# Patient Record
Sex: FEMALE | Race: WHITE | NOT HISPANIC OR LATINO | Employment: FULL TIME | URBAN - METROPOLITAN AREA
[De-identification: names, ages, dates, MRNs, and addresses within clinical notes are randomized per-mention and may not be internally consistent; named-entity substitution may affect disease eponyms.]

---

## 2017-06-14 ENCOUNTER — OFFICE VISIT (OUTPATIENT)
Dept: URGENT CARE | Facility: PHYSICIAN GROUP | Age: 37
End: 2017-06-14
Payer: COMMERCIAL

## 2017-06-14 VITALS
SYSTOLIC BLOOD PRESSURE: 114 MMHG | RESPIRATION RATE: 20 BRPM | BODY MASS INDEX: 24.49 KG/M2 | WEIGHT: 147 LBS | OXYGEN SATURATION: 93 % | HEART RATE: 69 BPM | TEMPERATURE: 99.6 F | HEIGHT: 65 IN | DIASTOLIC BLOOD PRESSURE: 76 MMHG

## 2017-06-14 DIAGNOSIS — J30.9 ALLERGIC RHINITIS, UNSPECIFIED ALLERGIC RHINITIS TRIGGER, UNSPECIFIED RHINITIS SEASONALITY: ICD-10-CM

## 2017-06-14 DIAGNOSIS — J02.9 PHARYNGITIS, UNSPECIFIED ETIOLOGY: ICD-10-CM

## 2017-06-14 DIAGNOSIS — J02.9 SORE THROAT: ICD-10-CM

## 2017-06-14 LAB
INT CON NEG: NEGATIVE
INT CON POS: POSITIVE
S PYO AG THROAT QL: NEGATIVE

## 2017-06-14 PROCEDURE — 99202 OFFICE O/P NEW SF 15 MIN: CPT | Performed by: EMERGENCY MEDICINE

## 2017-06-14 PROCEDURE — 87880 STREP A ASSAY W/OPTIC: CPT | Performed by: EMERGENCY MEDICINE

## 2017-06-14 ASSESSMENT — ENCOUNTER SYMPTOMS
SWOLLEN GLANDS: 1
MYALGIAS: 0
NAUSEA: 0
CHILLS: 1
HOARSE VOICE: 0
VOMITING: 0
DIARRHEA: 0
HEARTBURN: 0
HEADACHES: 0
COUGH: 0
SHORTNESS OF BREATH: 0

## 2017-06-14 NOTE — MR AVS SNAPSHOT
"        Maria Teresa Wick Justyna   2017 5:00 PM   Office Visit   MRN: [id number]    Department:  [facility]   Dept Phone:  [phone]    Description:  Female : 1980   Provider:  Wallace St M.D.           Reason for Visit     Sore Throat C/o sore throat x2 days      Allergies as of 2017     Allergen Noted Reactions    Cillins [Penicillins] 2017   Swelling      You were diagnosed with     Pharyngitis, unspecified etiology   [[id number]]       Sore throat   [[id number]]       Allergic rhinitis, unspecified allergic rhinitis trigger, unspecified rhinitis seasonality   [[id number]]         Vital Signs     Blood Pressure Pulse Temperature Respirations Height Weight    114/76 mmHg 69 37.6 °C (99.6 °F) 20 1.651 m (5' 5\") 66.679 kg (147 lb)    Body Mass Index Oxygen Saturation Last Menstrual Period Breastfeeding? Smoking Status       24.46 kg/m2 93% 2017 No Current Some Day Smoker       Basic Information     Date Of Birth Sex Race Ethnicity Preferred Language    1980 Female White Non- English      Health Maintenance     Patient has no pending health maintenance at this time      Results     POCT Rapid Strep A      Component    Rapid Strep Screen    Negative    Internal Control Positive    Positive    Internal Control Negative    Negative                        Current Immunizations     No immunizations on file.      Below and/or attached are the medications your provider expects you to take. Review all of your home medications and newly ordered medications with your provider and/or pharmacist. Follow medication instructions as directed by your provider and/or pharmacist. Please keep your medication list with you and share with your provider. Update the information when medications are discontinued, doses are changed, or new medications (including over-the-counter products) are added; and carry medication information at all times in the event of emergency situations     Allergies:  " CILLINS - Swelling               Medications  Valid as of: June 14, 2017 -  5:31 PM    Generic Name Brand Name Tablet Size Instructions for use    Ibuprofen   Take  by mouth.        .                 Medicines prescribed today were sent to:     [id number] [place] [facility] [address] [facility]    [address] [facility] [address]    Phone: [phone] Fax: [phone]    Open 24 Hours?: No      Medication refill instructions:       If your prescription bottle indicates you have medication refills left, it is not necessary to call your provider’s office. Please contact your pharmacy and they will refill your medication.    If your prescription bottle indicates you do not have any refills left, you may request refills at any time through one of the following ways: The online [facility] system (except Urgent Care), by calling your provider’s office, or by asking your pharmacy to contact your provider’s office with a refill request. Medication refills are processed only during regular business hours and may not be available until the next business day. Your provider may request additional information or to have a follow-up visit with you prior to refilling your medication.   *Please Note: Medication refills are assigned a new Rx number when refilled electronically. Your pharmacy may indicate that no refills were authorized even though a new prescription for the same medication is available at the pharmacy. Please request the medicine by name with the pharmacy before contacting your provider for a refill.        Instructions    Use saline nasal irrigation, such as with a Neti Pot, as needed daily for relief of nasal or sinus congestion relief.  Use over-the-counter inhaled nasal steroid (Flonase, Nasonex, Rhinocort, Nasacort) daily; continue for at least 2-3 weeks.  Use over-the-counter pain reliever, such as acetaminophen (Tylenol), ibuprofen (Advil, Motrin) or naproxen (Aleve) as needed;  follow package directions for dosing.    Pharyngitis  Pharyngitis is redness, pain, and swelling (inflammation) of your pharynx.   CAUSES   Pharyngitis is usually caused by infection. Most of the time, these infections are from viruses (viral) and are part of a cold. However, sometimes pharyngitis is caused by bacteria (bacterial). Pharyngitis can also be caused by allergies. Viral pharyngitis may be spread from person to person by coughing, sneezing, and personal items or utensils (cups, forks, spoons, toothbrushes). Bacterial pharyngitis may be spread from person to person by more intimate contact, such as kissing.   SIGNS AND SYMPTOMS   Symptoms of pharyngitis include:    · Sore throat.    · Tiredness (fatigue).    · Low-grade fever.    · Headache.  · Joint pain and muscle aches.  · Skin rashes.  · Swollen lymph nodes.  · Plaque-like film on throat or tonsils (often seen with bacterial pharyngitis).  DIAGNOSIS   Your health care provider will ask you questions about your illness and your symptoms. Your medical history, along with a physical exam, is often all that is needed to diagnose pharyngitis. Sometimes, a rapid strep test is done. Other lab tests may also be done, depending on the suspected cause.   TREATMENT   Viral pharyngitis will usually get better in 3-4 days without the use of medicine. Bacterial pharyngitis is treated with medicines that kill germs (antibiotics).   HOME CARE INSTRUCTIONS   · Drink enough water and fluids to keep your urine clear or pale yellow.    · Only take over-the-counter or prescription medicines as directed by your health care provider:    ¨ If you are prescribed antibiotics, make sure you finish them even if you start to feel better.    ¨ Do not take aspirin.    · Get lots of rest.    · Gargle with 8 oz of salt water (½ tsp of salt per 1 qt of water) as often as every 1-2 hours to soothe your throat.    · Throat lozenges (if you are not at risk for choking) or sprays may be  used to soothe your throat.  SEEK MEDICAL CARE IF:   · You have large, tender lumps in your neck.  · You have a rash.  · You cough up green, yellow-brown, or bloody spit.  SEEK IMMEDIATE MEDICAL CARE IF:   · Your neck becomes stiff.  · You drool or are unable to swallow liquids.  · You vomit or are unable to keep medicines or liquids down.  · You have severe pain that does not go away with the use of recommended medicines.  · You have trouble breathing (not caused by a stuffy nose).  MAKE SURE YOU:   · Understand these instructions.  · Will watch your condition.  · Will get help right away if you are not doing well or get worse.     This information is not intended to replace advice given to you by your health care provider. Make sure you discuss any questions you have with your health care provider.     Document Released: 12/18/2006 Document Revised: 10/08/2014 Document Reviewed: 08/25/2014  [employer] Interactive Patient Education ©2016 [employer]  Allergic Rhinitis  Allergic rhinitis is when the mucous membranes in the nose respond to allergens. Allergens are particles in the air that cause your body to have an allergic reaction. This causes you to release allergic antibodies. Through a chain of events, these eventually cause you to release histamine into the blood stream. Although meant to protect the body, it is this release of histamine that causes your discomfort, such as frequent sneezing, congestion, and an itchy, runny nose.   CAUSES  Seasonal allergic rhinitis (hay fever) is caused by pollen allergens that may come from grasses, trees, and weeds. Year-round allergic rhinitis (perennial allergic rhinitis) is caused by allergens such as house dust mites, pet dander, and mold spores.  SYMPTOMS  · Nasal stuffiness (congestion).  · Itchy, runny nose with sneezing and tearing of the eyes.  DIAGNOSIS  Your health care provider can help you determine the allergen or allergens that trigger your symptoms. If you  and your health care provider are unable to determine the allergen, skin or blood testing may be used. Your health care provider will diagnose your condition after taking your health history and performing a physical exam. Your health care provider may assess you for other related conditions, such as asthma, pink eye, or an ear infection.  TREATMENT  Allergic rhinitis does not have a cure, but it can be controlled by:  · Medicines that block allergy symptoms. These may include allergy shots, nasal sprays, and oral antihistamines.  · Avoiding the allergen.  Hay fever may often be treated with antihistamines in pill or nasal spray forms. Antihistamines block the effects of histamine. There are over-the-counter medicines that may help with nasal congestion and swelling around the eyes. Check with your health care provider before taking or giving this medicine.  If avoiding the allergen or the medicine prescribed do not work, there are many new medicines your health care provider can prescribe. Stronger medicine may be used if initial measures are ineffective. Desensitizing injections can be used if medicine and avoidance does not work. Desensitization is when a patient is given ongoing shots until the body becomes less sensitive to the allergen. Make sure you follow up with your health care provider if problems continue.  HOME CARE INSTRUCTIONS  It is not possible to completely avoid allergens, but you can reduce your symptoms by taking steps to limit your exposure to them. It helps to know exactly what you are allergic to so that you can avoid your specific triggers.  SEEK MEDICAL CARE IF:  · You have a fever.  · You develop a cough that does not stop easily (persistent).  · You have shortness of breath.  · You start wheezing.  · Symptoms interfere with normal daily activities.     This information is not intended to replace advice given to you by your health care provider. Make sure you discuss any questions you have  with your health care provider.     Document Released: 09/12/2002 Document Revised: 01/08/2016 Document Reviewed: 08/25/2014  [employer] Interactive Patient Education ©2016 [employer] Inc.            [employer] Access Code: Activation code not generated  Current MyChart Status: Active          Quit Tobacco Information     Do you want to quit using tobacco?    Quitting tobacco decreases risks of cancer, heart and lung disease, increases life expectancy, improves sense of taste and smell, and increases spending money, among other benefits.    If you are thinking about quitting, we can help.  • [facility] Quit Tobacco Program: [phone]  o Program occurs weekly for four weeks and includes pharmacist consultation on products to support quitting smoking or chewing tobacco. A provider referral is needed for pharmacist consultation.  • Tobacco Users Help Hotline: 8-246-QUITNOW (863-0894) or https://[place].quitlogix.org/  o Free, confidential telephone and online coaching for [place] residents. Sessions are designed on a schedule that is convenient for you. Eligible clients receive free nicotine replacement therapy.  • Nationally: www.smokefree.gov  o Information and professional assistance to support both immediate and long-term needs as you become, and remain, a non-smoker. Smokefree.gov allows you to choose the help that best fits your needs.

## 2017-06-15 NOTE — PATIENT INSTRUCTIONS
Use saline nasal irrigation, such as with a Neti Pot, as needed daily for relief of nasal or sinus congestion relief.  Use over-the-counter inhaled nasal steroid (Flonase, Nasonex, Rhinocort, Nasacort) daily; continue for at least 2-3 weeks.  Use over-the-counter pain reliever, such as acetaminophen (Tylenol), ibuprofen (Advil, Motrin) or naproxen (Aleve) as needed; follow package directions for dosing.    Pharyngitis  Pharyngitis is redness, pain, and swelling (inflammation) of your pharynx.   CAUSES   Pharyngitis is usually caused by infection. Most of the time, these infections are from viruses (viral) and are part of a cold. However, sometimes pharyngitis is caused by bacteria (bacterial). Pharyngitis can also be caused by allergies. Viral pharyngitis may be spread from person to person by coughing, sneezing, and personal items or utensils (cups, forks, spoons, toothbrushes). Bacterial pharyngitis may be spread from person to person by more intimate contact, such as kissing.   SIGNS AND SYMPTOMS   Symptoms of pharyngitis include:    · Sore throat.    · Tiredness (fatigue).    · Low-grade fever.    · Headache.  · Joint pain and muscle aches.  · Skin rashes.  · Swollen lymph nodes.  · Plaque-like film on throat or tonsils (often seen with bacterial pharyngitis).  DIAGNOSIS   Your health care provider will ask you questions about your illness and your symptoms. Your medical history, along with a physical exam, is often all that is needed to diagnose pharyngitis. Sometimes, a rapid strep test is done. Other lab tests may also be done, depending on the suspected cause.   TREATMENT   Viral pharyngitis will usually get better in 3-4 days without the use of medicine. Bacterial pharyngitis is treated with medicines that kill germs (antibiotics).   HOME CARE INSTRUCTIONS   · Drink enough water and fluids to keep your urine clear or pale yellow.    · Only take over-the-counter or prescription medicines as directed by your  health care provider:    ¨ If you are prescribed antibiotics, make sure you finish them even if you start to feel better.    ¨ Do not take aspirin.    · Get lots of rest.    · Gargle with 8 oz of salt water (½ tsp of salt per 1 qt of water) as often as every 1-2 hours to soothe your throat.    · Throat lozenges (if you are not at risk for choking) or sprays may be used to soothe your throat.  SEEK MEDICAL CARE IF:   · You have large, tender lumps in your neck.  · You have a rash.  · You cough up green, yellow-brown, or bloody spit.  SEEK IMMEDIATE MEDICAL CARE IF:   · Your neck becomes stiff.  · You drool or are unable to swallow liquids.  · You vomit or are unable to keep medicines or liquids down.  · You have severe pain that does not go away with the use of recommended medicines.  · You have trouble breathing (not caused by a stuffy nose).  MAKE SURE YOU:   · Understand these instructions.  · Will watch your condition.  · Will get help right away if you are not doing well or get worse.     This information is not intended to replace advice given to you by your health care provider. Make sure you discuss any questions you have with your health care provider.     Document Released: 12/18/2006 Document Revised: 10/08/2014 Document Reviewed: 08/25/2014  [employer] Interactive Patient Education ©2016 [employer]  Allergic Rhinitis  Allergic rhinitis is when the mucous membranes in the nose respond to allergens. Allergens are particles in the air that cause your body to have an allergic reaction. This causes you to release allergic antibodies. Through a chain of events, these eventually cause you to release histamine into the blood stream. Although meant to protect the body, it is this release of histamine that causes your discomfort, such as frequent sneezing, congestion, and an itchy, runny nose.   CAUSES  Seasonal allergic rhinitis (hay fever) is caused by pollen allergens that may come from grasses, trees, and  weeds. Year-round allergic rhinitis (perennial allergic rhinitis) is caused by allergens such as house dust mites, pet dander, and mold spores.  SYMPTOMS  · Nasal stuffiness (congestion).  · Itchy, runny nose with sneezing and tearing of the eyes.  DIAGNOSIS  Your health care provider can help you determine the allergen or allergens that trigger your symptoms. If you and your health care provider are unable to determine the allergen, skin or blood testing may be used. Your health care provider will diagnose your condition after taking your health history and performing a physical exam. Your health care provider may assess you for other related conditions, such as asthma, pink eye, or an ear infection.  TREATMENT  Allergic rhinitis does not have a cure, but it can be controlled by:  · Medicines that block allergy symptoms. These may include allergy shots, nasal sprays, and oral antihistamines.  · Avoiding the allergen.  Hay fever may often be treated with antihistamines in pill or nasal spray forms. Antihistamines block the effects of histamine. There are over-the-counter medicines that may help with nasal congestion and swelling around the eyes. Check with your health care provider before taking or giving this medicine.  If avoiding the allergen or the medicine prescribed do not work, there are many new medicines your health care provider can prescribe. Stronger medicine may be used if initial measures are ineffective. Desensitizing injections can be used if medicine and avoidance does not work. Desensitization is when a patient is given ongoing shots until the body becomes less sensitive to the allergen. Make sure you follow up with your health care provider if problems continue.  HOME CARE INSTRUCTIONS  It is not possible to completely avoid allergens, but you can reduce your symptoms by taking steps to limit your exposure to them. It helps to know exactly what you are allergic to so that you can avoid your  specific triggers.  SEEK MEDICAL CARE IF:  · You have a fever.  · You develop a cough that does not stop easily (persistent).  · You have shortness of breath.  · You start wheezing.  · Symptoms interfere with normal daily activities.     This information is not intended to replace advice given to you by your health care provider. Make sure you discuss any questions you have with your health care provider.     Document Released: 09/12/2002 Document Revised: 01/08/2016 Document Reviewed: 08/25/2014  [employer] Interactive Patient Education ©2016 [employer] Inc.

## 2017-06-15 NOTE — PROGRESS NOTES
"Subjective:      Maria Teresa Jansen is a 37 y.o. female who presents with Sore Throat            Pharyngitis   This is a new problem. The current episode started yesterday. The problem has been unchanged. The pain is worse on the right side. There has been no fever. The pain is moderate. Associated symptoms include congestion and swollen glands. Pertinent negatives include no coughing, diarrhea, ear pain, headaches, hoarse voice, plugged ear sensation, shortness of breath or vomiting. She has had exposure to strep. She has tried cool liquids for the symptoms. The treatment provided no relief.       Review of Systems   Constitutional: Positive for chills. Negative for malaise/fatigue.   HENT: Positive for congestion. Negative for ear pain, hoarse voice, nosebleeds and tinnitus.    Respiratory: Negative for cough and shortness of breath.    Gastrointestinal: Negative for heartburn, nausea, vomiting and diarrhea.   Musculoskeletal: Negative for myalgias.   Skin: Negative for itching.   Neurological: Negative for headaches.   Endo/Heme/Allergies: Positive for environmental allergies.     PMH:  has no past medical history on file.  MEDS:   Current outpatient prescriptions:   •  Ibuprofen (ADVIL PO), Take  by mouth., Disp: , Rfl:   ALLERGIES:   Allergies   Allergen Reactions   • Cillins [Penicillins] Swelling     SURGHX: History reviewed. No pertinent past surgical history.  SOCHX:  reports that she has been smoking Cigarettes.  She has been smoking about 0.25 packs per day. She has never used smokeless tobacco. She reports that she drinks about 3.0 oz of alcohol per week. She reports that she does not use illicit drugs.  FH: family history is not on file.       Objective:     /76 mmHg  Pulse 69  Temp(Src) 37.6 °C (99.6 °F)  Resp 20  Ht 1.651 m (5' 5\")  Wt 66.679 kg (147 lb)  BMI 24.46 kg/m2  SpO2 93%  LMP 06/12/2017  Breastfeeding? No     Physical Exam   Constitutional: She appears well-developed and " well-nourished. She is cooperative.  Non-toxic appearance. No distress.   HENT:   Right Ear: Tympanic membrane and ear canal normal.   Left Ear: Tympanic membrane and ear canal normal.   Nose: Mucosal edema present. No rhinorrhea.   Mouth/Throat: No trismus in the jaw. No uvula swelling. Posterior oropharyngeal erythema present. No oropharyngeal exudate or posterior oropharyngeal edema.   Tonsils 2+ bilaterally   Eyes: Conjunctivae are normal.   Neck: Trachea normal. Neck supple.   Cardiovascular: Normal rate, regular rhythm and normal heart sounds.    Pulmonary/Chest: Effort normal and breath sounds normal.   Lymphadenopathy:     She has cervical adenopathy.        Right cervical: Superficial cervical adenopathy present. No posterior cervical adenopathy present.       Left cervical: Superficial cervical adenopathy present. No posterior cervical adenopathy present.   Neurological: She is alert.   Skin: Skin is warm and dry.   Psychiatric: She has a normal mood and affect.               Assessment/Plan:     1. Pharyngitis, unspecified etiology  Recommended supportive care measures, including rest, increasing oral fluid intake and use of over-the-counter medications for relief of symptoms.    2. Sore throat  Negative- POCT Rapid Strep A    3. Allergic rhinitis, unspecified allergic rhinitis trigger, unspecified rhinitis seasonality  Recommended nasal saline irrigation daily, OTC inhaled nasal steroid daily.

## 2017-08-06 ENCOUNTER — OFFICE VISIT (OUTPATIENT)
Dept: URGENT CARE | Facility: CLINIC | Age: 37
End: 2017-08-06
Payer: COMMERCIAL

## 2017-08-06 ENCOUNTER — HOSPITAL ENCOUNTER (OUTPATIENT)
Facility: MEDICAL CENTER | Age: 37
End: 2017-08-06
Attending: PHYSICIAN ASSISTANT
Payer: COMMERCIAL

## 2017-08-06 VITALS
HEIGHT: 65 IN | SYSTOLIC BLOOD PRESSURE: 110 MMHG | BODY MASS INDEX: 24.12 KG/M2 | DIASTOLIC BLOOD PRESSURE: 58 MMHG | RESPIRATION RATE: 18 BRPM | TEMPERATURE: 98.8 F | HEART RATE: 93 BPM | OXYGEN SATURATION: 97 % | WEIGHT: 144.8 LBS

## 2017-08-06 DIAGNOSIS — R21 RASH AND NONSPECIFIC SKIN ERUPTION: ICD-10-CM

## 2017-08-06 DIAGNOSIS — L08.9 SKIN INFECTION: ICD-10-CM

## 2017-08-06 PROCEDURE — 87070 CULTURE OTHR SPECIMN AEROBIC: CPT

## 2017-08-06 PROCEDURE — 99202 OFFICE O/P NEW SF 15 MIN: CPT | Performed by: PHYSICIAN ASSISTANT

## 2017-08-06 RX ORDER — SULFAMETHOXAZOLE AND TRIMETHOPRIM 800; 160 MG/1; MG/1
1 TABLET ORAL 2 TIMES DAILY
Qty: 20 TAB | Refills: 0 | Status: SHIPPED | OUTPATIENT
Start: 2017-08-06 | End: 2017-08-16

## 2017-08-06 ASSESSMENT — ENCOUNTER SYMPTOMS
MUSCULOSKELETAL NEGATIVE: 1
NEUROLOGICAL NEGATIVE: 1
CONSTITUTIONAL NEGATIVE: 1

## 2017-08-06 NOTE — PROGRESS NOTES
"Subjective:      Maria Teresa Jansen is a 37 y.o. female who presents with Rash            Rash  This is a new problem. The current episode started in the past 7 days (L stomach). The problem is unchanged. The affected locations include the abdomen. The rash is characterized by redness and itchiness. It is unknown if there was an exposure to a precipitant. Pertinent negatives include no facial edema or joint pain. Past treatments include nothing. The treatment provided mild relief. There is no history of allergies or eczema.       Review of Systems   Constitutional: Negative.    Musculoskeletal: Negative.  Negative for joint pain.   Skin: Positive for itching and rash.   Neurological: Negative.           Objective:     /58 mmHg  Pulse 93  Temp(Src) 37.1 °C (98.8 °F)  Resp 18  Ht 1.651 m (5' 5\")  Wt 65.681 kg (144 lb 12.8 oz)  BMI 24.10 kg/m2  SpO2 97%     Physical Exam   Constitutional: She is oriented to person, place, and time. She appears well-developed and well-nourished. No distress.   Abdominal: She exhibits no distension. There is no tenderness.   Neurological: She is alert and oriented to person, place, and time. No sensory deficit.   Skin: Skin is warm and dry. Rash (~1cm irreg sl raised scab like les, +tend/indur.; no surrounding redn) noted.   Psychiatric: She has a normal mood and affect. Her behavior is normal. Judgment and thought content normal.   Nursing note and vitals reviewed.    Filed Vitals:    08/06/17 1135   BP: 110/58   Pulse: 93   Temp: 37.1 °C (98.8 °F)   Resp: 18   Height: 1.651 m (5' 5\")   Weight: 65.681 kg (144 lb 12.8 oz)   SpO2: 97%     Active Ambulatory Problems     Diagnosis Date Noted   • No Active Ambulatory Problems     Resolved Ambulatory Problems     Diagnosis Date Noted   • No Resolved Ambulatory Problems     No Additional Past Medical History     Current Outpatient Prescriptions on File Prior to Visit   Medication Sig Dispense Refill   • Ibuprofen (ADVIL PO) Take  " by mouth.       No current facility-administered medications on file prior to visit.     Gargles, Cepacol lozenges, Aleve/Advil as needed for throat pain  History reviewed. No pertinent family history.  Cillins            Assessment/Plan:     ·  abd skin les, r/o infectious      · meds rx; if sx persist, may refer to Derm  · cx ordered

## 2017-08-06 NOTE — MR AVS SNAPSHOT
"Maria Teresa Jansen   2017 11:30 AM   Office Visit   MRN: [id number]    Department:  [facility]   Dept Phone:  [phone]    Description:  Female : 1980   Provider:  Mckay Moran PA-C           Reason for Visit     Rash x1week, rash on left side of stomach      Allergies as of 2017     Allergen Noted Reactions    Cillins [Penicillins] 2017   Swelling      You were diagnosed with     Rash and nonspecific skin eruption   [726346]         Vital Signs     Blood Pressure Pulse Temperature Respirations Height Weight    110/58 mmHg 93 37.1 °C (98.8 °F) 18 1.651 m (5' 5\") 65.681 kg (144 lb 12.8 oz)    Body Mass Index Oxygen Saturation Smoking Status             24.10 kg/m2 97% Current Some Day Smoker         Basic Information     Date Of Birth Sex Race Ethnicity Preferred Language    1980 Female White Non- English      Health Maintenance        Date Due Completion Dates    IMM DTaP/Tdap/Td Vaccine (1 - Tdap) 1999 ---    PAP SMEAR 2001 ---    IMM INFLUENZA (1) 2017 ---            Current Immunizations     No immunizations on file.      Below and/or attached are the medications your provider expects you to take. Review all of your home medications and newly ordered medications with your provider and/or pharmacist. Follow medication instructions as directed by your provider and/or pharmacist. Please keep your medication list with you and share with your provider. Update the information when medications are discontinued, doses are changed, or new medications (including over-the-counter products) are added; and carry medication information at all times in the event of emergency situations     Allergies:  CILLINS - Swelling               Medications  Valid as of: 2017 - 11:50 AM    Generic Name Brand Name Tablet Size Instructions for use    Ibuprofen   Take  by mouth.        .                 Medicines prescribed today were sent to:     [id number] " [facility] [address] [facility]    [address] [facility] [address] [facility] [address]    Phone: [phone] Fax: [phone]    Open 24 Hours?: No      Medication refill instructions:       If your prescription bottle indicates you have medication refills left, it is not necessary to call your provider’s office. Please contact your pharmacy and they will refill your medication.    If your prescription bottle indicates you do not have any refills left, you may request refills at any time through one of the following ways: The online [employer] system (except Urgent Care), by calling your provider’s office, or by asking your pharmacy to contact your provider’s office with a refill request. Medication refills are processed only during regular business hours and may not be available until the next business day. Your provider may request additional information or to have a follow-up visit with you prior to refilling your medication.   *Please Note: Medication refills are assigned a new Rx number when refilled electronically. Your pharmacy may indicate that no refills were authorized even though a new prescription for the same medication is available at the pharmacy. Please request the medicine by name with the pharmacy before contacting your provider for a refill.           [employer] Access Code: Activation code not generated  Current [employer] Status: Active          Quit Tobacco Information     Do you want to quit using tobacco?    Quitting tobacco decreases risks of cancer, heart and lung disease, increases life expectancy, improves sense of taste and smell, and increases spending money, among other benefits.    If you are thinking about quitting, we can help.  • [facility] Quit Tobacco Program: [phone]  o Program occurs weekly for four weeks and includes pharmacist consultation on products to support quitting smoking or chewing tobacco. A provider referral is needed for pharmacist consultation.  • Tobacco  Users Help Hotline: 7-112-QUIT-NOW (476-3894) or https://[place].quitlogix.org/  o Free, confidential telephone and online coaching for [place] residents. Sessions are designed on a schedule that is convenient for you. Eligible clients receive free nicotine replacement therapy.  • Nationally: www.smokefree.gov  o Information and professional assistance to support both immediate and long-term needs as you become, and remain, a non-smoker. Smokefree.gov allows you to choose the help that best fits your needs.

## 2017-08-08 LAB
BACTERIA WND AEROBE CULT: NORMAL
SIGNIFICANT IND 70042: NORMAL
SITE SITE: NORMAL
SOURCE SOURCE: NORMAL

## 2023-12-12 ENCOUNTER — APPOINTMENT (RX ONLY)
Dept: URBAN - METROPOLITAN AREA CLINIC 38 | Facility: CLINIC | Age: 43
Setting detail: DERMATOLOGY
End: 2023-12-12

## 2023-12-12 DIAGNOSIS — Z71.89 OTHER SPECIFIED COUNSELING: ICD-10-CM

## 2023-12-12 DIAGNOSIS — D18.0 HEMANGIOMA: ICD-10-CM

## 2023-12-12 DIAGNOSIS — L82.1 OTHER SEBORRHEIC KERATOSIS: ICD-10-CM

## 2023-12-12 DIAGNOSIS — D22 MELANOCYTIC NEVI: ICD-10-CM

## 2023-12-12 DIAGNOSIS — L81.4 OTHER MELANIN HYPERPIGMENTATION: ICD-10-CM

## 2023-12-12 PROBLEM — D18.01 HEMANGIOMA OF SKIN AND SUBCUTANEOUS TISSUE: Status: ACTIVE | Noted: 2023-12-12

## 2023-12-12 PROBLEM — D22.9 MELANOCYTIC NEVI, UNSPECIFIED: Status: ACTIVE | Noted: 2023-12-12

## 2023-12-12 PROBLEM — D22.5 MELANOCYTIC NEVI OF TRUNK: Status: ACTIVE | Noted: 2023-12-12

## 2023-12-12 PROCEDURE — 99203 OFFICE O/P NEW LOW 30 MIN: CPT

## 2023-12-12 PROCEDURE — ? COUNSELING

## 2023-12-12 ASSESSMENT — LOCATION DETAILED DESCRIPTION DERM
LOCATION DETAILED: RIGHT MEDIAL TRAPEZIAL NECK
LOCATION DETAILED: INFERIOR MID FOREHEAD
LOCATION DETAILED: LEFT DISTAL DORSAL FOREARM
LOCATION DETAILED: MIDDLE STERNUM
LOCATION DETAILED: EPIGASTRIC SKIN
LOCATION DETAILED: LEFT CLAVICULAR NECK
LOCATION DETAILED: RIGHT PROXIMAL DORSAL FOREARM
LOCATION DETAILED: LEFT LATERAL BUCCAL CHEEK
LOCATION DETAILED: PERIUMBILICAL SKIN
LOCATION DETAILED: LEFT INFERIOR UPPER BACK
LOCATION DETAILED: LEFT LATERAL MALAR CHEEK
LOCATION DETAILED: RIGHT INFERIOR UPPER BACK

## 2023-12-12 ASSESSMENT — LOCATION SIMPLE DESCRIPTION DERM
LOCATION SIMPLE: LEFT FOREARM
LOCATION SIMPLE: LEFT CHEEK
LOCATION SIMPLE: RIGHT FOREARM
LOCATION SIMPLE: POSTERIOR NECK
LOCATION SIMPLE: LEFT UPPER BACK
LOCATION SIMPLE: ABDOMEN
LOCATION SIMPLE: LEFT ANTERIOR NECK
LOCATION SIMPLE: INFERIOR FOREHEAD
LOCATION SIMPLE: RIGHT UPPER BACK
LOCATION SIMPLE: CHEST

## 2023-12-12 ASSESSMENT — LOCATION ZONE DERM
LOCATION ZONE: NECK
LOCATION ZONE: FACE
LOCATION ZONE: ARM
LOCATION ZONE: TRUNK